# Patient Record
Sex: FEMALE | Race: WHITE | NOT HISPANIC OR LATINO | Employment: OTHER | URBAN - METROPOLITAN AREA
[De-identification: names, ages, dates, MRNs, and addresses within clinical notes are randomized per-mention and may not be internally consistent; named-entity substitution may affect disease eponyms.]

---

## 2017-08-16 ENCOUNTER — ESTABLISHED COMPREHENSIVE EXAM (OUTPATIENT)
Dept: URBAN - METROPOLITAN AREA CLINIC 40 | Facility: CLINIC | Age: 37
End: 2017-08-16

## 2017-08-16 DIAGNOSIS — H52.11: ICD-10-CM

## 2017-08-16 DIAGNOSIS — H52.203: ICD-10-CM

## 2017-08-16 PROCEDURE — 92499OP2 OPTOMAP RETINAL SCREENING BOTH EYES

## 2017-08-16 PROCEDURE — 92014 COMPRE OPH EXAM EST PT 1/>: CPT

## 2017-08-16 PROCEDURE — 92015 DETERMINE REFRACTIVE STATE: CPT

## 2017-08-16 ASSESSMENT — KERATOMETRY
OD_AXISANGLE_DEGREES: 015
OD_K1POWER_DIOPTERS: 44.00
OD_AXISANGLE2_DEGREES: 105
OS_AXISANGLE2_DEGREES: 066
OS_K2POWER_DIOPTERS: 42.25
OS_AXISANGLE_DEGREES: 156
OD_K2POWER_DIOPTERS: 42.75
OS_K1POWER_DIOPTERS: 44.25

## 2017-08-16 ASSESSMENT — VISUAL ACUITY
OU_SC: 20/30
OS_SC: J1
OD_SC: J1
OU_SC: J1
OD_SC: 20/40+1
OS_SC: 20/30-1

## 2017-08-29 ENCOUNTER — LASIK CONSULTATION (OUTPATIENT)
Dept: URBAN - METROPOLITAN AREA CLINIC 39 | Facility: CLINIC | Age: 37
End: 2017-08-29

## 2017-08-29 DIAGNOSIS — H52.11: ICD-10-CM

## 2017-08-29 PROCEDURE — 99499LK REFRACTIVE CONSULT/LASIK

## 2017-08-29 ASSESSMENT — VISUAL ACUITY
OS_SC: J1
OS_SC: 20/80-1
OD_SC: J1
OD_SC: 20/40

## 2017-08-29 ASSESSMENT — KERATOMETRY
OD_K1POWER_DIOPTERS: 44.00
OD_AXISANGLE2_DEGREES: 105
OS_AXISANGLE2_DEGREES: 066
OS_K2POWER_DIOPTERS: 42.25
OS_K1POWER_DIOPTERS: 44.25
OD_AXISANGLE_DEGREES: 015
OD_K2POWER_DIOPTERS: 42.75
OS_AXISANGLE_DEGREES: 156

## 2017-08-29 ASSESSMENT — TONOMETRY
OS_IOP_MMHG: 16
OD_IOP_MMHG: 15

## 2018-04-12 NOTE — PATIENT DISCUSSION
Gave trials of CL's in DVO rx to se if VA is still good at near.  I do not have reference of her old RX.  If near still decreased, then trial mono at F/U.

## 2018-10-22 ENCOUNTER — EST. PATIENT EMERGENCY (OUTPATIENT)
Age: 38
End: 2018-10-22

## 2018-10-22 DIAGNOSIS — H57.11: ICD-10-CM

## 2018-10-22 DIAGNOSIS — S05.01XA: ICD-10-CM

## 2018-10-22 PROCEDURE — 99212 OFFICE O/P EST SF 10 MIN: CPT

## 2018-10-22 RX ORDER — TOBRAMYCIN 3 MG/ML
1 SOLUTION/ DROPS OPHTHALMIC
Start: 2018-10-22 | End: 2018-10-28

## 2018-10-22 ASSESSMENT — KERATOMETRY
OS_AXISANGLE_DEGREES: 156
OD_K2POWER_DIOPTERS: 42.75
OS_K1POWER_DIOPTERS: 44.25
OS_AXISANGLE2_DEGREES: 066
OD_AXISANGLE2_DEGREES: 105
OD_AXISANGLE_DEGREES: 015
OS_K2POWER_DIOPTERS: 42.25
OD_K1POWER_DIOPTERS: 44.00

## 2018-10-22 ASSESSMENT — VISUAL ACUITY
OD_CC: 20/30
OS_CC: 20/25

## 2018-10-22 ASSESSMENT — TONOMETRY: OS_IOP_MMHG: 15

## 2018-10-23 ENCOUNTER — EST. PATIENT EMERGENCY (OUTPATIENT)
Age: 38
End: 2018-10-23

## 2018-10-23 DIAGNOSIS — H57.11: ICD-10-CM

## 2018-10-23 DIAGNOSIS — S05.01XA: ICD-10-CM

## 2018-10-23 PROCEDURE — 99212 OFFICE O/P EST SF 10 MIN: CPT

## 2018-10-23 ASSESSMENT — KERATOMETRY
OS_AXISANGLE_DEGREES: 156
OS_AXISANGLE2_DEGREES: 066
OS_K2POWER_DIOPTERS: 42.25
OD_K2POWER_DIOPTERS: 42.75
OD_AXISANGLE2_DEGREES: 105
OS_K1POWER_DIOPTERS: 44.25
OD_K1POWER_DIOPTERS: 44.00
OD_AXISANGLE_DEGREES: 015

## 2018-10-23 ASSESSMENT — VISUAL ACUITY
OS_CC: 20/25
OD_SC: J1
OS_SC: J1
OD_CC: 20/40-1

## 2018-10-24 ENCOUNTER — FOLLOW UP (OUTPATIENT)
Age: 38
End: 2018-10-24

## 2018-10-24 DIAGNOSIS — S05.01XA: ICD-10-CM

## 2018-10-24 PROCEDURE — 99212 OFFICE O/P EST SF 10 MIN: CPT

## 2018-10-24 ASSESSMENT — KERATOMETRY
OD_AXISANGLE_DEGREES: 015
OS_AXISANGLE_DEGREES: 156
OD_K1POWER_DIOPTERS: 44.00
OS_K1POWER_DIOPTERS: 44.25
OS_AXISANGLE2_DEGREES: 066
OD_K2POWER_DIOPTERS: 42.75
OD_AXISANGLE2_DEGREES: 105
OS_K2POWER_DIOPTERS: 42.25

## 2018-10-24 ASSESSMENT — VISUAL ACUITY
OD_CC: 20/30
OS_CC: 20/25-2

## 2019-03-05 ENCOUNTER — ESTABLISHED COMPREHENSIVE EXAM (OUTPATIENT)
Dept: URBAN - METROPOLITAN AREA CLINIC 40 | Facility: CLINIC | Age: 39
End: 2019-03-05

## 2019-03-05 DIAGNOSIS — H52.4: ICD-10-CM

## 2019-03-05 DIAGNOSIS — H52.203: ICD-10-CM

## 2019-03-05 DIAGNOSIS — H52.11: ICD-10-CM

## 2019-03-05 PROCEDURE — 92499OP2 OPTOMAP RETINAL SCREENING BOTH EYES

## 2019-03-05 PROCEDURE — 92014 COMPRE OPH EXAM EST PT 1/>: CPT

## 2019-03-05 PROCEDURE — 92015 DETERMINE REFRACTIVE STATE: CPT

## 2019-03-05 ASSESSMENT — KERATOMETRY
OD_AXISANGLE_DEGREES: 015
OS_AXISANGLE2_DEGREES: 066
OD_K2POWER_DIOPTERS: 42.75
OD_K1POWER_DIOPTERS: 44.25
OD_AXISANGLE2_DEGREES: 109
OD_K1POWER_DIOPTERS: 44.00
OS_K2POWER_DIOPTERS: 42.5
OD_AXISANGLE2_DEGREES: 105
OS_K2POWER_DIOPTERS: 42.25
OS_AXISANGLE_DEGREES: 156
OD_AXISANGLE_DEGREES: 19
OS_AXISANGLE2_DEGREES: 66
OS_K1POWER_DIOPTERS: 44.25

## 2019-03-05 ASSESSMENT — VISUAL ACUITY
OU_SC: J1
OS_CC: 20/20
OD_SC: J1
OD_CC: 20/20
OD_SC: 20/40-2
OS_SC: 20/40-1
OU_SC: 20/25
OU_CC: 20/15
OS_SC: J1

## 2019-03-05 ASSESSMENT — TONOMETRY
OS_IOP_MMHG: 12
OD_IOP_MMHG: 13

## 2020-03-10 ENCOUNTER — ESTABLISHED COMPREHENSIVE EXAM (OUTPATIENT)
Dept: URBAN - METROPOLITAN AREA CLINIC 40 | Facility: CLINIC | Age: 40
End: 2020-03-10

## 2020-03-10 DIAGNOSIS — H52.203: ICD-10-CM

## 2020-03-10 DIAGNOSIS — H52.4: ICD-10-CM

## 2020-03-10 DIAGNOSIS — H52.11: ICD-10-CM

## 2020-03-10 PROCEDURE — 92015 DETERMINE REFRACTIVE STATE: CPT

## 2020-03-10 PROCEDURE — 92014 COMPRE OPH EXAM EST PT 1/>: CPT

## 2020-03-10 PROCEDURE — 92499OP2 OPTOMAP RETINAL SCREENING BOTH EYES

## 2020-03-10 ASSESSMENT — KERATOMETRY
OD_AXISANGLE_DEGREES: 015
OD_AXISANGLE2_DEGREES: 109
OD_AXISANGLE_DEGREES: 21
OD_AXISANGLE_DEGREES: 19
OS_AXISANGLE_DEGREES: 157
OS_K2POWER_DIOPTERS: 42.25
OD_K1POWER_DIOPTERS: 44.00
OS_K1POWER_DIOPTERS: 44.25
OS_AXISANGLE_DEGREES: 156
OS_AXISANGLE2_DEGREES: 67
OD_K1POWER_DIOPTERS: 44.25
OD_AXISANGLE2_DEGREES: 105
OS_K2POWER_DIOPTERS: 42.5
OD_K2POWER_DIOPTERS: 42.75
OD_AXISANGLE2_DEGREES: 111
OS_AXISANGLE2_DEGREES: 066
OS_AXISANGLE2_DEGREES: 66

## 2020-03-10 ASSESSMENT — VISUAL ACUITY
OU_SC: J1 @ 14"
OS_CC: 20/20-2
OD_SC: J2
OS_SC: J2-
OD_SC: 20/40
OD_CC: 20/20
OS_SC: 20/40
OU_SC: 20/25-2
OU_CC: 20/20

## 2020-03-10 ASSESSMENT — TONOMETRY
OS_IOP_MMHG: 16
OD_IOP_MMHG: 15

## 2022-02-07 ASSESSMENT — KERATOMETRY
OS_AXISANGLE2_DEGREES: 67
OS_K2POWER_DIOPTERS: 42.5
OD_AXISANGLE2_DEGREES: 111
OS_AXISANGLE_DEGREES: 156
OD_K1POWER_DIOPTERS: 44.25
OD_AXISANGLE2_DEGREES: 105
OD_K2POWER_DIOPTERS: 42.75
OS_K2POWER_DIOPTERS: 42.25
OD_AXISANGLE_DEGREES: 015
OS_K1POWER_DIOPTERS: 44.25
OD_AXISANGLE2_DEGREES: 109
OD_AXISANGLE_DEGREES: 19
OS_AXISANGLE2_DEGREES: 66
OS_AXISANGLE2_DEGREES: 066
OD_K1POWER_DIOPTERS: 44.00
OS_AXISANGLE_DEGREES: 157
OD_AXISANGLE_DEGREES: 21

## 2022-02-08 ENCOUNTER — COMPREHENSIVE EXAM (OUTPATIENT)
Dept: URBAN - METROPOLITAN AREA CLINIC 40 | Facility: CLINIC | Age: 42
End: 2022-02-08

## 2022-02-08 DIAGNOSIS — H52.4: ICD-10-CM

## 2022-02-08 DIAGNOSIS — H52.203: ICD-10-CM

## 2022-02-08 DIAGNOSIS — H52.11: ICD-10-CM

## 2022-02-08 PROCEDURE — 92499OP2 OPTOMAP RETINAL SCREENING BOTH EYES

## 2022-02-08 PROCEDURE — 92014 COMPRE OPH EXAM EST PT 1/>: CPT

## 2022-02-08 PROCEDURE — 92310-5 LEVEL 5 CONTACT LENS MANAGEMENT

## 2022-02-08 PROCEDURE — 92015 DETERMINE REFRACTIVE STATE: CPT

## 2022-02-08 ASSESSMENT — VISUAL ACUITY
OU_SC: J1+
OS_CC: 20/20-1
OD_CC: 20/20-2
OS_SC: J1
OU_CC: 20/20-1
OD_SC: J1-

## 2022-02-08 ASSESSMENT — TONOMETRY
OS_IOP_MMHG: 17
OD_IOP_MMHG: 15

## 2022-02-21 ASSESSMENT — KERATOMETRY
OS_AXISANGLE2_DEGREES: 66
OS_AXISANGLE_DEGREES: 157
OS_AXISANGLE2_DEGREES: 67
OD_AXISANGLE_DEGREES: 015
OS_AXISANGLE2_DEGREES: 066
OS_K2POWER_DIOPTERS: 42.5
OD_AXISANGLE_DEGREES: 21
OD_K1POWER_DIOPTERS: 44.00
OD_K2POWER_DIOPTERS: 42.75
OD_AXISANGLE_DEGREES: 19
OS_AXISANGLE_DEGREES: 156
OD_AXISANGLE2_DEGREES: 109
OS_K1POWER_DIOPTERS: 44.25
OS_K2POWER_DIOPTERS: 42.25
OD_K1POWER_DIOPTERS: 44.25
OD_AXISANGLE2_DEGREES: 111
OD_AXISANGLE2_DEGREES: 105

## 2022-02-22 ENCOUNTER — CONTACT LENSES/GLASSES VISIT (OUTPATIENT)
Dept: URBAN - METROPOLITAN AREA CLINIC 40 | Facility: CLINIC | Age: 42
End: 2022-02-22

## 2022-02-22 DIAGNOSIS — H52.11: ICD-10-CM

## 2022-02-22 PROCEDURE — 92310F

## 2022-03-02 ENCOUNTER — FOLLOW UP (OUTPATIENT)
Dept: URBAN - METROPOLITAN AREA CLINIC 40 | Facility: CLINIC | Age: 42
End: 2022-03-02

## 2022-03-02 DIAGNOSIS — H52.11: ICD-10-CM

## 2022-03-02 PROCEDURE — 92310F

## 2022-03-02 ASSESSMENT — KERATOMETRY
OD_K1POWER_DIOPTERS: 44.25
OD_AXISANGLE_DEGREES: 19
OD_AXISANGLE_DEGREES: 21
OS_AXISANGLE_DEGREES: 156
OD_AXISANGLE2_DEGREES: 105
OS_AXISANGLE2_DEGREES: 066
OS_K1POWER_DIOPTERS: 44.25
OS_AXISANGLE_DEGREES: 157
OS_K2POWER_DIOPTERS: 42.5
OD_AXISANGLE_DEGREES: 015
OD_AXISANGLE2_DEGREES: 109
OD_K2POWER_DIOPTERS: 42.75
OS_AXISANGLE2_DEGREES: 66
OD_AXISANGLE2_DEGREES: 111
OS_AXISANGLE2_DEGREES: 67
OD_K1POWER_DIOPTERS: 44.00
OS_K2POWER_DIOPTERS: 42.25

## 2022-03-02 ASSESSMENT — VISUAL ACUITY
OS_CC: 20/20
OD_CC: 20/20-1
OU_CC: 20/15-1

## 2022-03-07 ASSESSMENT — KERATOMETRY
OS_AXISANGLE_DEGREES: 157
OD_K1POWER_DIOPTERS: 44.00
OS_AXISANGLE2_DEGREES: 67
OS_AXISANGLE_DEGREES: 156
OS_K2POWER_DIOPTERS: 42.25
OS_K1POWER_DIOPTERS: 44.25
OD_AXISANGLE2_DEGREES: 111
OD_AXISANGLE_DEGREES: 19
OD_AXISANGLE2_DEGREES: 109
OS_AXISANGLE2_DEGREES: 066
OD_K2POWER_DIOPTERS: 42.75
OD_K1POWER_DIOPTERS: 44.25
OD_AXISANGLE_DEGREES: 015
OS_AXISANGLE2_DEGREES: 66
OD_AXISANGLE2_DEGREES: 105
OD_AXISANGLE_DEGREES: 21
OS_K2POWER_DIOPTERS: 42.5

## 2022-03-08 ENCOUNTER — PREPPED CHART (OUTPATIENT)
Dept: URBAN - METROPOLITAN AREA CLINIC 40 | Facility: CLINIC | Age: 42
End: 2022-03-08